# Patient Record
Sex: MALE | Race: WHITE | NOT HISPANIC OR LATINO | Employment: OTHER
[De-identification: names, ages, dates, MRNs, and addresses within clinical notes are randomized per-mention and may not be internally consistent; named-entity substitution may affect disease eponyms.]

---

## 2019-10-31 NOTE — TELEPHONE ENCOUNTER
RECORDS RECEIVED FROM: [facility] Everywhere   DATE RECEIVED: 11-21   NOTES STATUS DETAILS   OFFICE NOTE from referring provider    N/A    OFFICE NOTE from other cardiologist    N/A    DISCHARGE SUMMARY from hospital    N/A    DISCHARGE REPORT from the ER   Care Everywhere 11-13-17   OPERATIVE REPORT    N/A    MEDICATION LIST   N/A    LABS     BMP   N/A    CBC   N/A    CMP   N/A    Lipids   Care Everywhere 10-10-19   TSH   Care Everywhere 3-23-18   DIAGNOSTIC PROCEDURES     EKG   N/A    Monitor Reports   N/A    IMAGING (DISC & REPORT)      Echo   N/A    Stress Tests   N/A    Cath   N/A    MRI/MRA   N/A    CT/CTA   N/A

## 2019-11-06 ENCOUNTER — HEALTH MAINTENANCE LETTER (OUTPATIENT)
Age: 54
End: 2019-11-06

## 2019-11-12 ENCOUNTER — DOCUMENTATION ONLY (OUTPATIENT)
Dept: CARE COORDINATION | Facility: CLINIC | Age: 54
End: 2019-11-12

## 2019-11-12 ENCOUNTER — MYC MEDICAL ADVICE (OUTPATIENT)
Dept: CARE COORDINATION | Facility: CLINIC | Age: 54
End: 2019-11-12

## 2019-11-21 ENCOUNTER — PRE VISIT (OUTPATIENT)
Dept: CARDIOLOGY | Facility: CLINIC | Age: 54
End: 2019-11-21

## 2020-03-06 ASSESSMENT — ENCOUNTER SYMPTOMS
NIGHT SWEATS: 1
FEVER: 0
STIFFNESS: 0
SLEEP DISTURBANCES DUE TO BREATHING: 0
MUSCLE WEAKNESS: 0
WEIGHT GAIN: 0
PALPITATIONS: 0
NECK PAIN: 0
HYPOTENSION: 0
LIGHT-HEADEDNESS: 1
MYALGIAS: 0
MUSCLE CRAMPS: 1
HALLUCINATIONS: 0
CHILLS: 0
WEIGHT LOSS: 0
DECREASED APPETITE: 0
LEG PAIN: 1
ALTERED TEMPERATURE REGULATION: 0
FATIGUE: 1
POLYPHAGIA: 0
INCREASED ENERGY: 0
SYNCOPE: 0
ORTHOPNEA: 1
POLYDIPSIA: 0
ARTHRALGIAS: 0
JOINT SWELLING: 0
HYPERTENSION: 1
BACK PAIN: 1
EXERCISE INTOLERANCE: 0

## 2020-03-09 ENCOUNTER — OFFICE VISIT (OUTPATIENT)
Dept: CARDIOLOGY | Facility: CLINIC | Age: 55
End: 2020-03-09
Attending: INTERNAL MEDICINE
Payer: COMMERCIAL

## 2020-03-09 VITALS
HEIGHT: 68 IN | DIASTOLIC BLOOD PRESSURE: 95 MMHG | OXYGEN SATURATION: 99 % | SYSTOLIC BLOOD PRESSURE: 179 MMHG | HEART RATE: 48 BPM | WEIGHT: 194.9 LBS | BODY MASS INDEX: 29.54 KG/M2

## 2020-03-09 DIAGNOSIS — Z13.220 LIPID SCREENING: ICD-10-CM

## 2020-03-09 DIAGNOSIS — I1A.0 RESISTANT HYPERTENSION: ICD-10-CM

## 2020-03-09 DIAGNOSIS — R06.02 SOB (SHORTNESS OF BREATH): ICD-10-CM

## 2020-03-09 DIAGNOSIS — R00.2 PALPITATIONS: Primary | ICD-10-CM

## 2020-03-09 DIAGNOSIS — R42 DIZZINESS: ICD-10-CM

## 2020-03-09 DIAGNOSIS — Z82.3 FAMILY HISTORY OF STROKE: ICD-10-CM

## 2020-03-09 PROCEDURE — 93005 ELECTROCARDIOGRAM TRACING: CPT | Mod: ZF

## 2020-03-09 PROCEDURE — G0463 HOSPITAL OUTPT CLINIC VISIT: HCPCS | Mod: 25,ZF

## 2020-03-09 PROCEDURE — 93010 ELECTROCARDIOGRAM REPORT: CPT | Mod: ZP | Performed by: INTERNAL MEDICINE

## 2020-03-09 PROCEDURE — 99204 OFFICE O/P NEW MOD 45 MIN: CPT | Mod: 25 | Performed by: INTERNAL MEDICINE

## 2020-03-09 RX ORDER — BLOOD PRESSURE TEST KIT-LARGE
KIT MISCELLANEOUS
COMMUNITY
Start: 2019-10-14

## 2020-03-09 RX ORDER — AMLODIPINE BESYLATE 5 MG/1
5 TABLET ORAL AT BEDTIME
Qty: 90 TABLET | Refills: 3 | Status: SHIPPED | OUTPATIENT
Start: 2020-03-09 | End: 2020-07-28

## 2020-03-09 RX ORDER — ATENOLOL 50 MG/1
TABLET ORAL
COMMUNITY
Start: 2020-02-18 | End: 2020-03-09 | Stop reason: ALTCHOICE

## 2020-03-09 RX ORDER — SPIRONOLACTONE 25 MG/1
12.5 TABLET ORAL DAILY
Qty: 90 TABLET | Refills: 3 | Status: SHIPPED | OUTPATIENT
Start: 2020-03-09 | End: 2020-03-11

## 2020-03-09 RX ORDER — CARVEDILOL 25 MG/1
25 TABLET ORAL 2 TIMES DAILY WITH MEALS
Qty: 180 TABLET | Refills: 3 | Status: SHIPPED | OUTPATIENT
Start: 2020-03-09 | End: 2020-03-11

## 2020-03-09 RX ORDER — ISOSORBIDE MONONITRATE 30 MG/1
30 TABLET, EXTENDED RELEASE ORAL DAILY PRN
Qty: 90 TABLET | Refills: 0 | Status: SHIPPED | OUTPATIENT
Start: 2020-03-09 | End: 2020-04-16

## 2020-03-09 RX ORDER — LOSARTAN POTASSIUM 25 MG/1
TABLET ORAL
COMMUNITY
Start: 2019-10-31 | End: 2020-03-09 | Stop reason: ALTCHOICE

## 2020-03-09 RX ORDER — HYDROCHLOROTHIAZIDE 25 MG/1
TABLET ORAL
COMMUNITY
Start: 2020-02-01 | End: 2020-05-18

## 2020-03-09 RX ORDER — HYDROCHLOROTHIAZIDE 12.5 MG/1
12.5 CAPSULE ORAL DAILY
Qty: 90 CAPSULE | Refills: 3 | Status: SHIPPED | OUTPATIENT
Start: 2020-03-09 | End: 2020-05-18

## 2020-03-09 ASSESSMENT — PAIN SCALES - GENERAL: PAINLEVEL: NO PAIN (0)

## 2020-03-09 ASSESSMENT — MIFFLIN-ST. JEOR: SCORE: 1693.56

## 2020-03-09 NOTE — PATIENT INSTRUCTIONS
Patient Instructions:  It was a pleasure to see you in the cardiology clinic today.      If you have any questions, you can reach my nurse, Steph ROUSE LPN, at [phone].  Press Option #1 for the [facility], and then press Option #4 for nursing.    We are encouraging the use of [employer] to communicate with your HealthCare Provider    Medication Changes:  - Stop Losartan.  - Stop Atenolol.  - Start Hydrochlorothiazide 12.5 mg every day.  - Start Spironolactone 12.5 mg every day.  - Start Carvedilol 25 mg twice daily.  - Start Amlodipine 2.5 mg at bedtime.  If your SBP is greater than 140 mmHg, increase to 5 mg every day.  - Start Imdur 30 mg every day as needed for SBP greater than 180 mmHg.   Recommendations:   - Fasting labs at your convenience.  - Monitor and record daily blood pressures and pulse.  Contact clinic with 1-2 weeks worth of readings.    Studies Ordered:   - Echocardiogram  - Ultrasound of the carotids  - Ultrasound of the kidneys    The results from today include: EKG.    Please follow up: With Dr. Street in 3-6 months.    Sincerely,    Lele Street MD     If you have an urgent need after hours (8:00 am to 4:30 pm) please call [phone] and ask for the cardiology fellow on call.

## 2020-03-09 NOTE — LETTER
"3/9/2020      RE: Charlene Gayle  [address]  [facility] [id number]       Dear Colleague,    Thank you for the opportunity to participate in the care of your patient, Charlene Gayle, at the [facility] HEART [facility]. Please see a copy of my visit note below.    HPI: I had the privilege to evaluate and examine Mr. Nalini Chang \"Wolf\",   Is a 54 years male patient with a longstanding history of arterial hypertension with a with a longstanding treatment of resistant hypertension and intolerance for different antihypertensive drugs. Patient has had numerous visits and discussions in the past with his health care providers regarding his intolerance of certain antihypertensive medications.  He reported palpitations when he was taking amlodipine manufactured in [place] while he tolerated amlodipine which was manufactured in [place].   He also mentioned  that atenolol may be causing palpitationsHe reports that a pressure as high as 200 was obtained in the past when he was off amlodipine and on only atenolol and hydrochlorothiazide in the past. He reported that the lisinopril caused a cough. Losartan was prescribed but he never filled it because of reports of transient manufacturing problems. No other ARB was tried.  In the past patient had back problems. Patient has never been a smoker.          PAST MEDICAL HISTORY:    2017 ER admission [facility]    DISCHARGE DIAGNOSES:  1. Flank pain of unclear significance, ?musculoskeletal  2. Exertional SOB  3. Elevated troponins  4. Hypokalemia   5. Hypertension  6. Thyroid nodule      CURRENT MEDICATIONS:  Current Outpatient Medications   Medication Sig Dispense Refill     amLODIPine (NORVASC) 5 MG tablet Take 1 tablet (5 mg) by mouth At Bedtime 90 tablet 3     Blood Pressure Monitoring (BLOOD PRESSURE MONITOR 3) VÍCTOR Use as directed       hydrochlorothiazide (HYDRODIURIL) 25 MG tablet        " hydrochlorothiazide (MICROZIDE) 12.5 MG capsule Take 1 capsule (12.5 mg) by mouth daily 90 capsule 3     isosorbide mononitrate (IMDUR) 30 MG 24 hr tablet Take 1 tablet (30 mg) by mouth daily as needed (If SBP is greater than 180 mmHg.) 90 tablet 0     carvedilol (COREG) 25 MG tablet Take 1 tablet (25 mg) by mouth 2 times daily (with meals) 180 tablet 3     spironolactone (ALDACTONE) 25 MG tablet Take 0.5 tablets (12.5 mg) by mouth daily 90 tablet 3       PAST SURGICAL HISTORY:  No past surgical history on file.    ALLERGIES    Several intolerances antiHTN meds -see HPI    FAMILY HISTORY:  Hypertension    SOCIAL HISTORY:  Social History     Socioeconomic History     Marital status:      Spouse name: Not on file     Number of children: Not on file     Years of education: Not on file     Highest education level: Not on file   Occupational History     Not on file   Social Needs     Financial resource strain: Not on file     Food insecurity     Worry: Not on file     Inability: Not on file     Transportation needs     Medical: Not on file     Non-medical: Not on file   Tobacco Use     Smoking status: Not on file   Substance and Sexual Activity     Alcohol use: Not on file     Drug use: Not on file     Sexual activity: Not on file   Lifestyle     Physical activity     Days per week: Not on file     Minutes per session: Not on file     Stress: Not on file   Relationships     Social connections     Talks on phone: Not on file     Gets together: Not on file     Attends Anabaptist service: Not on file     Active member of club or organization: Not on file     Attends meetings of clubs or organizations: Not on file     Relationship status: Not on file     Intimate partner violence     Fear of current or ex partner: Not on file     Emotionally abused: Not on file     Physically abused: Not on file     Forced sexual activity: Not on file   Other Topics Concern     Not on file   Social History Narrative     Not on file  "      ROS:   Constitutional: No fever, chills, or sweats. No weight gain/loss   ENT: No visual disturbance, ear ache, epistaxis, sore throat  Allergies/Immunologic: Negative.   Respiratory: No cough, hemoptysia  Cardiovascular: As per HPI  GI: No nausea, vomiting, hematemesis, melena, or hematochezia  : No urinary frequency, dysuria, or hematuria  Integument: Negative  Psychiatric: Negative  Neuro: Negative  Endocrinology: Negative   Musculoskeletal: Negative    EXAM:  BP (!) 179/95 (BP Location: Left arm, Patient Position: Chair, Cuff Size: Adult Regular)   Pulse (!) 48   Ht 1.727 m (5' 8\")   Wt 88.4 kg (194 lb 14.4 oz)   SpO2 99%   BMI 29.63 kg/m    In general, the patient is a pleasant male in no apparent distress.    HEENT: NC/AT.  PERRLA.  EOMI.  Sclerae white, not injected.  Nares clear.  Pharynx without erythema or exudate.  Dentition intact.    Neck: No adenopathy.  No thyromegaly. Carotids +4/4 bilaterally without bruits.  No jugular venous distension.   Heart: RRR. Normal S1, S2 splits physiologically. No murmur, rub, click, or gallop. The PMI is in the 5th ICS in the midclavicular line. There is no heave.    Lungs: CTA.  No ronchi, wheezes, rales.  No dullness to percussion.   Abdomen: Soft, nontender, nondistended. No organomegaly.  No bruits.   Extremities: No clubbing, cyanosis, or edema.  The pulses are +4/4 at the radial, brachial, femoral, popliteal, DP, and PT sites bilaterally.  No bruits are noted.  Neurologic: Alert and oriented to person/place/time, normal speech, gait and affect  Skin: No petechiae, purpura or rash.    Labs:    For Labs in the past - see [facility] Labs - care for everywhere    We have ordered several labs - see our assesment and plan        Procedures:    EKG: sin rhythm, left axis, inverted T in aVl, V5, V5    Assessment and Plan:     We discussed the results of the exam with the patient.  We discussed the importance of a heart healthy diet and lifestyle.  We " discussed following items which are described below in detail    Medication Changes:  - Stop Losartan.  - Stop Atenolol.  - Start Hydrochlorothiazide 12.5 mg every day.  - Start Spironolactone 12.5 mg every day.  - Start Carvedilol 25 mg twice daily.  - Start Amlodipine 2.5 mg at bedtime.  If your SBP is greater than 140 mmHg, increase to 5 mg every day.  - Start Imdur 30 mg every day as needed for SBP greater than 180 mmHg.             Recommendations:   - Fasting labs at your convenience.  - Monitor and record daily blood pressures and pulse.  Contact clinic with 1-2 weeks worth of readings.     Studies Ordered:   - Echocardiogram  - Ultrasound of the carotids  - Ultrasound of the kidneys          Please follow up: With Dr. Street in 3-6 months.      Lele Street MD, PhD  Professor of Medicine  Division of Cardiology       CC  Patient Care Team:  Paramjit Kay MD as PCP - General (Internal Medicine)  SELF, REFERRED  Answers for HPI/ROS submitted by the patient on 3/6/2020   General Symptoms: Yes  Skin Symptoms: No  HENT Symptoms: No  EYE SYMPTOMS: No  HEART SYMPTOMS: Yes  LUNG SYMPTOMS: No  INTESTINAL SYMPTOMS: No  URINARY SYMPTOMS: No  REPRODUCTIVE SYMPTOMS: Yes  SKELETAL SYMPTOMS: Yes  BLOOD SYMPTOMS: No  NERVOUS SYSTEM SYMPTOMS: No  MENTAL HEALTH SYMPTOMS: No  Fever: No  Loss of appetite: No  Weight loss: No  Weight gain: No  Fatigue: Yes  Night sweats: Yes  Chills: No  Increased stress: Yes  Excessive hunger: No  Excessive thirst: No  Feeling hot or cold when others believe the temperature is normal: No  Loss of height: No  Post-operative complications: No  Surgical site pain: No  Hallucinations: No  Change in or Loss of Energy: No  Hyperactivity: No  Confusion: No  Chest pain or pressure: No  Fast or irregular heartbeat: No  Pain in legs with walking: Yes  Trouble breathing while lying down: Yes  Fingers or toes appear blue: No  High blood pressure: Yes  Low blood pressure: No  Fainting: No  Murmurs:  No  Pacemaker: No  Varicose veins: No  Edema or swelling: No  Wake up at night with shortness of breath: No  Light-headedness: Yes  Exercise intolerance: No  Back pain: Yes  Muscle aches: No  Neck pain: No  Swollen joints: No  Joint pain: No  Bone pain: No  Muscle cramps: Yes  Muscle weakness: No  Joint stiffness: No  Bone fracture: No  Scrotal pain or swelling: No  Erectile dysfunction: No  Penile discharge: No  Genital ulcers: No  Reduced libido: Yes    Answers for HPI/ROS submitted by the patient on 3/6/2020   General Symptoms: Yes  Skin Symptoms: No  HENT Symptoms: No  EYE SYMPTOMS: No  HEART SYMPTOMS: Yes  LUNG SYMPTOMS: No  INTESTINAL SYMPTOMS: No  URINARY SYMPTOMS: No  REPRODUCTIVE SYMPTOMS: Yes  SKELETAL SYMPTOMS: Yes  BLOOD SYMPTOMS: No  NERVOUS SYSTEM SYMPTOMS: No  MENTAL HEALTH SYMPTOMS: No  Fever: No  Loss of appetite: No  Weight loss: No  Weight gain: No  Fatigue: Yes  Night sweats: Yes  Chills: No  Increased stress: Yes  Excessive hunger: No  Excessive thirst: No  Feeling hot or cold when others believe the temperature is normal: No  Loss of height: No  Post-operative complications: No  Surgical site pain: No  Hallucinations: No  Change in or Loss of Energy: No  Hyperactivity: No  Confusion: No  Chest pain or pressure: No  Fast or irregular heartbeat: No  Pain in legs with walking: Yes  Trouble breathing while lying down: Yes  Fingers or toes appear blue: No  High blood pressure: Yes  Low blood pressure: No  Fainting: No  Murmurs: No  Pacemaker: No  Varicose veins: No  Edema or swelling: No  Wake up at night with shortness of breath: No  Light-headedness: Yes  Exercise intolerance: No  Back pain: Yes  Muscle aches: No  Neck pain: No  Swollen joints: No  Joint pain: No  Bone pain: No  Muscle cramps: Yes  Muscle weakness: No  Joint stiffness: No  Bone fracture: No  Scrotal pain or swelling: No  Erectile dysfunction: No  Penile discharge: No  Genital ulcers: No  Reduced libido: Yes      Please do not  hesitate to contact me if you have any questions/concerns.     Sincerely,     Lele Street MD

## 2020-03-09 NOTE — NURSING NOTE
Chief Complaint   Patient presents with     New Patient      New Pt Visit     Vitals were taken and medications were reconciled. EKG was performed.    mAee Godoy  1:09 PM

## 2020-03-10 LAB — INTERPRETATION ECG - MUSE: NORMAL

## 2020-03-20 NOTE — PROGRESS NOTES
"HPI: I had the privilege to evaluate and examine Mr. Nalini Chang \"Wolf\",   Is a 54 years male patient with a longstanding history of arterial hypertension with a with a longstanding treatment of resistant hypertension and intolerance for different antihypertensive drugs. Patient has had numerous visits and discussions in the past with his health care providers regarding his intolerance of certain antihypertensive medications.  He reported palpitations when he was taking amlodipine manufactured in [place] while he tolerated amlodipine which was manufactured in [place].   He also mentioned  that atenolol may be causing palpitationsHe reports that a pressure as high as 200 was obtained in the past when he was off amlodipine and on only atenolol and hydrochlorothiazide in the past. He reported that the lisinopril caused a cough. Losartan was prescribed but he never filled it because of reports of transient manufacturing problems. No other ARB was tried.  In the past patient had back problems. Patient has never been a smoker.          PAST MEDICAL HISTORY:    2017 ER admission [facility]    DISCHARGE DIAGNOSES:  1. Flank pain of unclear significance, ?musculoskeletal  2. Exertional SOB  3. Elevated troponins  4. Hypokalemia   5. Hypertension  6. Thyroid nodule      CURRENT MEDICATIONS:  Current Outpatient Medications   Medication Sig Dispense Refill     amLODIPine (NORVASC) 5 MG tablet Take 1 tablet (5 mg) by mouth At Bedtime 90 tablet 3     Blood Pressure Monitoring (BLOOD PRESSURE MONITOR 3) VÍCTOR Use as directed       hydrochlorothiazide (HYDRODIURIL) 25 MG tablet        hydrochlorothiazide (MICROZIDE) 12.5 MG capsule Take 1 capsule (12.5 mg) by mouth daily 90 capsule 3     isosorbide mononitrate (IMDUR) 30 MG 24 hr tablet Take 1 tablet (30 mg) by mouth daily as needed (If SBP is greater than 180 mmHg.) 90 tablet 0     carvedilol (COREG) 25 MG tablet Take 1 tablet (25 mg) by mouth 2 times daily (with " meals) 180 tablet 3     spironolactone (ALDACTONE) 25 MG tablet Take 0.5 tablets (12.5 mg) by mouth daily 90 tablet 3       PAST SURGICAL HISTORY:  No past surgical history on file.    ALLERGIES    Several intolerances antiHTN meds -see HPI    FAMILY HISTORY:  Hypertension    SOCIAL HISTORY:  Social History     Socioeconomic History     Marital status:      Spouse name: Not on file     Number of children: Not on file     Years of education: Not on file     Highest education level: Not on file   Occupational History     Not on file   Social Needs     Financial resource strain: Not on file     Food insecurity     Worry: Not on file     Inability: Not on file     Transportation needs     Medical: Not on file     Non-medical: Not on file   Tobacco Use     Smoking status: Not on file   Substance and Sexual Activity     Alcohol use: Not on file     Drug use: Not on file     Sexual activity: Not on file   Lifestyle     Physical activity     Days per week: Not on file     Minutes per session: Not on file     Stress: Not on file   Relationships     Social connections     Talks on phone: Not on file     Gets together: Not on file     Attends Cheondoism service: Not on file     Active member of club or organization: Not on file     Attends meetings of clubs or organizations: Not on file     Relationship status: Not on file     Intimate partner violence     Fear of current or ex partner: Not on file     Emotionally abused: Not on file     Physically abused: Not on file     Forced sexual activity: Not on file   Other Topics Concern     Not on file   Social History Narrative     Not on file       ROS:   Constitutional: No fever, chills, or sweats. No weight gain/loss   ENT: No visual disturbance, ear ache, epistaxis, sore throat  Allergies/Immunologic: Negative.   Respiratory: No cough, hemoptysia  Cardiovascular: As per HPI  GI: No nausea, vomiting, hematemesis, melena, or hematochezia  : No urinary frequency, dysuria,  "or hematuria  Integument: Negative  Psychiatric: Negative  Neuro: Negative  Endocrinology: Negative   Musculoskeletal: Negative    EXAM:  BP (!) 179/95 (BP Location: Left arm, Patient Position: Chair, Cuff Size: Adult Regular)   Pulse (!) 48   Ht 1.727 m (5' 8\")   Wt 88.4 kg (194 lb 14.4 oz)   SpO2 99%   BMI 29.63 kg/m    In general, the patient is a pleasant male in no apparent distress.    HEENT: NC/AT.  PERRLA.  EOMI.  Sclerae white, not injected.  Nares clear.  Pharynx without erythema or exudate.  Dentition intact.    Neck: No adenopathy.  No thyromegaly. Carotids +4/4 bilaterally without bruits.  No jugular venous distension.   Heart: RRR. Normal S1, S2 splits physiologically. No murmur, rub, click, or gallop. The PMI is in the 5th ICS in the midclavicular line. There is no heave.    Lungs: CTA.  No ronchi, wheezes, rales.  No dullness to percussion.   Abdomen: Soft, nontender, nondistended. No organomegaly.  No bruits.   Extremities: No clubbing, cyanosis, or edema.  The pulses are +4/4 at the radial, brachial, femoral, popliteal, DP, and PT sites bilaterally.  No bruits are noted.  Neurologic: Alert and oriented to person/place/time, normal speech, gait and affect  Skin: No petechiae, purpura or rash.    Labs:    For Labs in the past - see [facility] Labs - care for everywhere    We have ordered several labs - see our assesment and plan        Procedures:    EKG: sin rhythm, left axis, inverted T in aVl, V5, V5    Assessment and Plan:     We discussed the results of the exam with the patient.  We discussed the importance of a heart healthy diet and lifestyle.  We discussed following items which are described below in detail    Medication Changes:  - Stop Losartan.  - Stop Atenolol.  - Start Hydrochlorothiazide 12.5 mg every day.  - Start Spironolactone 12.5 mg every day.  - Start Carvedilol 25 mg twice daily.  - Start Amlodipine 2.5 mg at bedtime.  If your SBP is greater than 140 mmHg, increase to 5 " mg every day.  - Start Imdur 30 mg every day as needed for SBP greater than 180 mmHg.             Recommendations:   - Fasting labs at your convenience.  - Monitor and record daily blood pressures and pulse.  Contact clinic with 1-2 weeks worth of readings.     Studies Ordered:   - Echocardiogram  - Ultrasound of the carotids  - Ultrasound of the kidneys          Please follow up: With Dr. Street in 3-6 months.      Lele Street MD, PhD  Professor of Medicine  Division of Cardiology       CC  Patient Care Team:  Paramjit Kay MD as PCP - General (Internal Medicine)  SELF, REFERRED  Answers for HPI/ROS submitted by the patient on 3/6/2020   General Symptoms: Yes  Skin Symptoms: No  HENT Symptoms: No  EYE SYMPTOMS: No  HEART SYMPTOMS: Yes  LUNG SYMPTOMS: No  INTESTINAL SYMPTOMS: No  URINARY SYMPTOMS: No  REPRODUCTIVE SYMPTOMS: Yes  SKELETAL SYMPTOMS: Yes  BLOOD SYMPTOMS: No  NERVOUS SYSTEM SYMPTOMS: No  MENTAL HEALTH SYMPTOMS: No  Fever: No  Loss of appetite: No  Weight loss: No  Weight gain: No  Fatigue: Yes  Night sweats: Yes  Chills: No  Increased stress: Yes  Excessive hunger: No  Excessive thirst: No  Feeling hot or cold when others believe the temperature is normal: No  Loss of height: No  Post-operative complications: No  Surgical site pain: No  Hallucinations: No  Change in or Loss of Energy: No  Hyperactivity: No  Confusion: No  Chest pain or pressure: No  Fast or irregular heartbeat: No  Pain in legs with walking: Yes  Trouble breathing while lying down: Yes  Fingers or toes appear blue: No  High blood pressure: Yes  Low blood pressure: No  Fainting: No  Murmurs: No  Pacemaker: No  Varicose veins: No  Edema or swelling: No  Wake up at night with shortness of breath: No  Light-headedness: Yes  Exercise intolerance: No  Back pain: Yes  Muscle aches: No  Neck pain: No  Swollen joints: No  Joint pain: No  Bone pain: No  Muscle cramps: Yes  Muscle weakness: No  Joint stiffness: No  Bone fracture:  No  Scrotal pain or swelling: No  Erectile dysfunction: No  Penile discharge: No  Genital ulcers: No  Reduced libido: Yes    Answers for HPI/ROS submitted by the patient on 3/6/2020   General Symptoms: Yes  Skin Symptoms: No  HENT Symptoms: No  EYE SYMPTOMS: No  HEART SYMPTOMS: Yes  LUNG SYMPTOMS: No  INTESTINAL SYMPTOMS: No  URINARY SYMPTOMS: No  REPRODUCTIVE SYMPTOMS: Yes  SKELETAL SYMPTOMS: Yes  BLOOD SYMPTOMS: No  NERVOUS SYSTEM SYMPTOMS: No  MENTAL HEALTH SYMPTOMS: No  Fever: No  Loss of appetite: No  Weight loss: No  Weight gain: No  Fatigue: Yes  Night sweats: Yes  Chills: No  Increased stress: Yes  Excessive hunger: No  Excessive thirst: No  Feeling hot or cold when others believe the temperature is normal: No  Loss of height: No  Post-operative complications: No  Surgical site pain: No  Hallucinations: No  Change in or Loss of Energy: No  Hyperactivity: No  Confusion: No  Chest pain or pressure: No  Fast or irregular heartbeat: No  Pain in legs with walking: Yes  Trouble breathing while lying down: Yes  Fingers or toes appear blue: No  High blood pressure: Yes  Low blood pressure: No  Fainting: No  Murmurs: No  Pacemaker: No  Varicose veins: No  Edema or swelling: No  Wake up at night with shortness of breath: No  Light-headedness: Yes  Exercise intolerance: No  Back pain: Yes  Muscle aches: No  Neck pain: No  Swollen joints: No  Joint pain: No  Bone pain: No  Muscle cramps: Yes  Muscle weakness: No  Joint stiffness: No  Bone fracture: No  Scrotal pain or swelling: No  Erectile dysfunction: No  Penile discharge: No  Genital ulcers: No  Reduced libido: Yes

## 2020-03-23 ENCOUNTER — ANCILLARY PROCEDURE (OUTPATIENT)
Dept: ULTRASOUND IMAGING | Facility: CLINIC | Age: 55
End: 2020-03-23
Attending: INTERNAL MEDICINE
Payer: COMMERCIAL

## 2020-03-23 ENCOUNTER — ANCILLARY PROCEDURE (OUTPATIENT)
Dept: CARDIOLOGY | Facility: CLINIC | Age: 55
End: 2020-03-23
Attending: INTERNAL MEDICINE
Payer: COMMERCIAL

## 2020-03-23 DIAGNOSIS — I1A.0 RESISTANT HYPERTENSION: ICD-10-CM

## 2020-03-23 DIAGNOSIS — R42 DIZZINESS: ICD-10-CM

## 2020-03-23 DIAGNOSIS — R06.02 SOB (SHORTNESS OF BREATH): ICD-10-CM

## 2020-03-23 DIAGNOSIS — Z82.3 FAMILY HISTORY OF STROKE: ICD-10-CM

## 2020-05-07 ENCOUNTER — TELEPHONE (OUTPATIENT)
Dept: CARDIOLOGY | Facility: CLINIC | Age: 55
End: 2020-05-07

## 2020-05-08 NOTE — TELEPHONE ENCOUNTER
[facility]    Phone Message    May a detailed message be left on voicemail: yes     Reason for Call: Other: Connected to supervisor      Action Taken: Message routed to:  [facility] Heart    Travel Screening: Not Applicable

## 2020-05-15 ENCOUNTER — TELEPHONE (OUTPATIENT)
Dept: CARDIOLOGY | Facility: CLINIC | Age: 55
End: 2020-05-15

## 2020-05-15 DIAGNOSIS — I1A.0 RESISTANT HYPERTENSION: Primary | ICD-10-CM

## 2020-05-15 NOTE — TELEPHONE ENCOUNTER
[facility]    Phone Message    May a detailed message be left on voicemail: yes     Reason for Call: Medication Question or concern regarding medication   Prescription Clarification  Name of Medication: hydrochlorothiazide (MICROZIDE) 12.5 MG capsule   Prescribing Provider:    Pharmacy: [facility] - [place], [place] - [address] [place] [address]   What on the order needs clarification? Pt wants Tiva   - but they only make 25mg tabs - ok to proceed with new med dose and ?          Action Taken: Message routed to:  [facility] heart    Travel Screening: Not Applicable

## 2020-05-18 RX ORDER — HYDROCHLOROTHIAZIDE 25 MG/1
25 TABLET ORAL DAILY
Qty: 90 TABLET | Refills: 1 | Status: SHIPPED | OUTPATIENT
Start: 2020-05-18 | End: 2020-07-28

## 2020-05-18 NOTE — TELEPHONE ENCOUNTER
Date: 5/18/2020    Time of Call: 4:38 PM     Diagnosis:  HTN     [ TORB ] Ordering provider: Dr Lele Street  Order: hydrochlorothiazide 25     Order received by: Steph Severino LPN     Follow-up/additional notes: Order correction.  Initial order should have been for corrected dosage.

## 2020-05-20 ENCOUNTER — TELEPHONE (OUTPATIENT)
Dept: CARDIOLOGY | Facility: CLINIC | Age: 55
End: 2020-05-20

## 2020-05-20 NOTE — TELEPHONE ENCOUNTER
Called and left VM for Pt requesting return call to clinic. Also noted F/U MyChart would be sent.  Clinic telephone provided.    Steph Severino LPN

## 2020-07-30 ENCOUNTER — TELEPHONE (OUTPATIENT)
Dept: CARDIOLOGY | Facility: CLINIC | Age: 55
End: 2020-07-30

## 2020-07-30 ENCOUNTER — NURSE TRIAGE (OUTPATIENT)
Dept: NURSING | Facility: CLINIC | Age: 55
End: 2020-07-30

## 2020-07-30 NOTE — TELEPHONE ENCOUNTER
S: Hypertension  B: Usual B/P is 130/80.  Using [employer] B/P machine at home, 145/86 in afternoon.  Around 10 pm 191/110.He did not miss any of his medications today.  He took Isosorbide 30 mg at 11:15 pm as instructed for elevated B/P. He self medicated and extra 8 mg dose of Perindopril at 11:20 pm .  Writer advised patient not to self medicate in the future.  Is C/O heavy feeling in left foot and tingling in right foot.   A: Writer paged On call Cardiologist at 12:32 am Dr. Molina (Fellow).  R: Have patient call Dr. Street office in morning and give his nurse an update on 7/29 B/P readings and medications taken.  Writer called to inform patient of Dr. Molina message. Patient will call clinic in the morning.   Tara Matias RN, [facility]    Additional Information    Negative: Severe difficulty breathing (e.g., struggling for each breath, speaks in single words)    Negative: Difficult to awaken or acting confused (e.g., disoriented, slurred speech)    Negative: [1] Weakness of the face, arm or leg on one side of the body AND [2] new onset    Negative: [1] Numbness (i.e., loss of sensation) of the face, arm or leg on one side of the body AND [2] new onset    Negative: [1] Chest pain lasts > 5 minutes AND [2] history of heart disease  (i.e., heart attack, bypass surgery, angina, angioplasty, CHF)    Negative: [1] Chest pain AND [2] took nitrogylcerin AND [3] pain was not relieved    Negative: Sounds like a life-threatening emergency to the triager    Negative: Symptom is main concern  (e.g., headache, chest pain)    Negative: Low blood pressure is main concern    Negative: [1] Systolic BP  >= 160 OR Diastolic >= 100 AND [2] cardiac or neurologic symptoms (e.g., chest pain, difficulty breathing, unsteady gait, blurred vision)    Negative: [1] Pregnant > 20 weeks (or postpartum < 6 weeks) AND [2] new hand or face swelling    Negative: [1] Pregnant > 20 weeks AND [2] BP Systolic BP  >= 140 OR Diastolic >=  90    Negative: [1] Systolic BP  >= 200 OR Diastolic >= 120  AND [2] having NO cardiac or neurologic symptoms    Negative: [1] Postpartum < 6 weeks AND [2] BP Systolic BP  >= 140 OR Diastolic >= 90    Negative: [1] Systolic BP  >= 180 OR Diastolic >= 110 AND [2] missed most recent dose of blood pressure medication    Systolic BP  >= 180 OR Diastolic >= 110    Protocols used: HIGH BLOOD PRESSURE-A-AH    COVID 19 Nurse Triage Plan/Patient Instructions    Please be aware that novel coronavirus (COVID-19) may be circulating in the community. If you develop symptoms such as fever, cough, or SOB or if you have concerns about the presence of another infection including coronavirus (COVID-19), please contact your health care provider or visit [email or web address].     Disposition/Instructions    Call providers Dr. Street  nurse in the morning.    Thank you for taking steps to prevent the spread of this virus.  o Limit your contact with others.  o Wear a simple mask to cover your cough.  o Wash your hands well and often.    Resources    [facility]: About COVID-19: [email or web address]    CDC: What to Do If You're Sick: www.cdc.gov/coronavirus/2019-ncov/about/steps-when-sick.html    CDC: Ending Home Isolation: www.cdc.gov/coronavirus/2019-ncov/hcp/disposition-in-home-patients.html     CDC: Caring for Someone: www.cdc.gov/coronavirus/2019-ncov/if-you-are-sick/care-for-someone.html     [facility]: Interim Guidance for Hospital Discharge to Home: www.health.[facility].[place].us/diseases/coronavirus/hcp/hospdischarge.pdf    [facility] clinical trials (COVID-19 research studies): [email or web address]-clinical-trials     Below are the COVID-19 hotlines at the [place] Department of [facility] ([facility]). Interpreters are available.   o For health questions: Call 353-498-8314 or 1-943.179.6374 (7 a.m. to 7 p.m.)  o For questions about schools and childcare: Call 697-933-8724 or 1-269.819.1934 (7 a.m. to 7 p.m.)

## 2020-07-30 NOTE — TELEPHONE ENCOUNTER
Received a call that patient's SBP is 190 but asymptomatic advised HHRN to give norvasc 5mg daily as written in last clinic note with     Advised RN to call clinic nurse tomorrow morning for medication reconciliation as patient has been self-adjusting his anti-hypertensive regimen.     Henny Molina  Cardiology PGY5

## 2020-10-14 DIAGNOSIS — I1A.0 RESISTANT HYPERTENSION: ICD-10-CM

## 2020-10-16 NOTE — TELEPHONE ENCOUNTER
ATENOLOL TABS 50MG      Last Written Prescription Date:  8/4/20  Last Fill Quantity: 45,   # refills: 3  Last Office Visit : Lele Street MD  Cardiology  3/9/2020  [facility]  Recommended 3-6 month follow up  Future Office visit:  None scheduled    Routing refill request to provider for review/approval because:  Blood pressure out of range   BP Readings from Last 3 Encounters:   03/09/20 (!) 179/95     Requested refill, current sig in EMR does not match last dictation note nor last phone encounter from 4/13/20  Medication Changes:  - Stop Losartan.  - Stop Atenolol.  - Start Hydrochlorothiazide 12.5 mg every day.  - Start Spironolactone 12.5 mg every day.  - Start Carvedilol 25 mg twice daily.  - Start Amlodipine 2.5 mg at bedtime.  If your SBP is greater than 140 mmHg, increase to 5 mg every day.  - Start Imdur 30 mg every day as needed for SBP greater than 180 mmHg.               Please see  7/30/20 and 10/5/20 phone encounters

## 2020-10-28 RX ORDER — ATENOLOL 50 MG/1
TABLET ORAL
OUTPATIENT
Start: 2020-10-28

## 2020-10-28 NOTE — TELEPHONE ENCOUNTER
Refill for atenolol has been refused as there are multiple medication discrepancies from med adjustments made by Pt.  Pt is currently residing in [place].  Pt informed to F/U with local PCP or cardiologist.    Steph Severino LPN

## 2020-11-29 ENCOUNTER — HEALTH MAINTENANCE LETTER (OUTPATIENT)
Age: 55
End: 2020-11-29

## 2021-09-19 ENCOUNTER — HEALTH MAINTENANCE LETTER (OUTPATIENT)
Age: 56
End: 2021-09-19

## 2022-01-09 ENCOUNTER — HEALTH MAINTENANCE LETTER (OUTPATIENT)
Age: 57
End: 2022-01-09

## 2022-11-21 ENCOUNTER — HEALTH MAINTENANCE LETTER (OUTPATIENT)
Age: 57
End: 2022-11-21

## 2023-04-16 ENCOUNTER — HEALTH MAINTENANCE LETTER (OUTPATIENT)
Age: 58
End: 2023-04-16